# Patient Record
Sex: FEMALE | Race: WHITE | NOT HISPANIC OR LATINO | URBAN - METROPOLITAN AREA
[De-identification: names, ages, dates, MRNs, and addresses within clinical notes are randomized per-mention and may not be internally consistent; named-entity substitution may affect disease eponyms.]

---

## 2021-10-30 ENCOUNTER — EMERGENCY (EMERGENCY)
Facility: HOSPITAL | Age: 36
LOS: 1 days | Discharge: HOME | End: 2021-10-30
Attending: EMERGENCY MEDICINE | Admitting: EMERGENCY MEDICINE
Payer: SELF-PAY

## 2021-10-30 VITALS
TEMPERATURE: 99 F | SYSTOLIC BLOOD PRESSURE: 108 MMHG | OXYGEN SATURATION: 98 % | WEIGHT: 179.9 LBS | HEIGHT: 69 IN | DIASTOLIC BLOOD PRESSURE: 66 MMHG | RESPIRATION RATE: 16 BRPM | HEART RATE: 79 BPM

## 2021-10-30 DIAGNOSIS — H57.11 OCULAR PAIN, RIGHT EYE: ICD-10-CM

## 2021-10-30 DIAGNOSIS — H53.8 OTHER VISUAL DISTURBANCES: ICD-10-CM

## 2021-10-30 DIAGNOSIS — Z23 ENCOUNTER FOR IMMUNIZATION: ICD-10-CM

## 2021-10-30 DIAGNOSIS — S05.11XA CONTUSION OF EYEBALL AND ORBITAL TISSUES, RIGHT EYE, INITIAL ENCOUNTER: ICD-10-CM
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
## 2021-10-30 DIAGNOSIS — Z88.6 ALLERGY STATUS TO ANALGESIC AGENT: ICD-10-CM

## 2021-10-30 PROCEDURE — 99284 EMERGENCY DEPT VISIT MOD MDM: CPT

## 2021-10-30 PROCEDURE — 99053 MED SERV 10PM-8AM 24 HR FAC: CPT

## 2021-10-30 RX ORDER — TETANUS TOXOID, REDUCED DIPHTHERIA TOXOID AND ACELLULAR PERTUSSIS VACCINE, ADSORBED 5; 2.5; 8; 8; 2.5 [IU]/.5ML; [IU]/.5ML; UG/.5ML; UG/.5ML; UG/.5ML
0.5 SUSPENSION INTRAMUSCULAR ONCE
Refills: 0 | Status: COMPLETED | OUTPATIENT
Start: 2021-10-30 | End: 2021-10-30

## 2021-10-30 RX ORDER — ACETAMINOPHEN 500 MG
650 TABLET ORAL ONCE
Refills: 0 | Status: COMPLETED | OUTPATIENT
Start: 2021-10-30 | End: 2021-10-30

## 2021-10-30 NOTE — ED ADULT TRIAGE NOTE - CHIEF COMPLAINT QUOTE
opening bottle of wine and "wine top went into my eye" +blurred vision to right eye and redness. no glasses/contacts. unk tdap lmp 10/21/2021

## 2021-10-30 NOTE — ED PROVIDER NOTE - NOTES
Recommends atropine BID, predforte drops QID, eyeshield, head elevated, restricted activity avoiding anything that would cause rebleeding. Dr. Castaneda's office at [facility] will call her on Monday to arrange visit for Monday. If she has ophthalmologist she can see earlier, that is acceptable also.

## 2021-10-30 NOTE — ED PROVIDER NOTE - PHYSICAL EXAMINATION
Constitutional: awake and alert, holding hand over right eye  HEENT: head normocephalic and atraumatic. moist mucous membranes  Eyes: there is ~20% hyphema of the right eye and conjunctival injection. question cell and flare on slit lamp exam. no visible corneal defect on slit lamp exam. no abnormal uptake of fluorescein on woods lamp exam and negative roberto's sign. visual acuity 20/20 right eye and left eye uncorrected. IOP 10mmHg.  Neck: supple, normal ROM  Cardiovascular: regular rate   Pulmonary: no respiratory distress  Gastrointestinal: abdomen flat and nondistended  Skin: warm, dry, normal for ethnicity  Musculoskeletal: no edema, no deformity  Neurological: oriented x4, no focal neurologic deficit.   Psychiatric: calm and cooperative

## 2021-10-30 NOTE — ED PROVIDER NOTE - CARE PROVIDER_API CALL
Christine Castaneda)  [facility]  [address] [place], [place] [address]  Phone: [phone]  Fax: [phone]  Follow Up Time:

## 2021-10-30 NOTE — ED PROVIDER NOTE - OBJECTIVE STATEMENT
36-year-old woman with past medical history of migraines who presents to the ED complaining of right eye traumatic injury. She was opening a bottle of wine that is slightly fizzy and has a cap like a beer bottle and the cap was "shot" into her right eye. She had pain and blurred vision at the time, now much improved but she does see blood in the anterior chamber and complains of headache. Does not wear glasses or contacts. Unsure of date of last tetanus booster.

## 2021-10-30 NOTE — ED PROVIDER NOTE - PATIENT PORTAL LINK FT
You can access the FollowMyHealth Patient Portal offered by [facility] by registering at the following website: [email or web address]. By joining [employer]’s FollowMyHealth portal, you will also be able to view your health information using other applications (apps) compatible with our system.

## 2021-10-30 NOTE — ED PROVIDER NOTE - CLINICAL SUMMARY MEDICAL DECISION MAKING FREE TEXT BOX
Patient presenting with traumatic eye injury with hyphema and possible cell and flare in anterior chamber. no sign of globe rupture, corneal abrasion, or increased IOP. visual acuity intact. VS normal. tetanus booster given and tylenol for headache, will have patient keep head elevated and will consult ophthalmologist on call.

## 2021-10-30 NOTE — ED PROVIDER NOTE - NSFOLLOWUPINSTRUCTIONS_ED_ALL_ED_FT
Wear the eye shield at all times. Keep your head elevated above level or your heart at all times, including sleeping (sleep propped up or in a recliner). Avoid any strenuous activity (exercise, laughing, crying, coughing, vomiting, etc) because these can cause "rebleeding". Use the two eyedrops that were prescribed. The ophthalmology office at [facility] will call you on Monday morning to give you an appointment for sometime on Monday. If you have an ophthalmologist you can see earlier than that, you may do so. Come back to the ER if pain gets worse, you have vomiting, blurry vision, or increased size or blood over your iris.              Hyphema    WHAT YOU NEED TO KNOW:    Hyphema is the presence of blood in the space between the cornea and the iris of your eye. The cornea is the clear layer that covers the front of your eye. It protects the iris (colored part of the eye) and pupil.    Eye Anatomy         DISCHARGE INSTRUCTIONS:    Medicines:   •Cycloplegics: This medicine relaxes your eye muscles and decreases your pain so your eye can heal.      •Steroids: These eyedrops help decrease inflammation.      •Eye pressure medicines: These help decrease eye pressure.      •Acetaminophen: This medicine decreases pain. It is available without a doctor's order. Ask how much to take and how often to take it. Follow directions. Acetaminophen can cause liver damage if not taken correctly.      •Antinausea medicine: This medicine may be given to calm your stomach and to help prevent vomiting.       •Bowel movement softeners: This medicine makes it easier for you to have a bowel movement. It will help prevent straining, which can increase eye pressure.      •Take your medicine as directed. Contact your healthcare provider if you think your medicine is not helping or if you have side effects. Tell him of her if you are allergic to any medicine. Keep a list of the medicines, vitamins, and herbs you take. Include the amounts, and when and why you take them. Bring the list or the pill bottles to follow-up visits. Carry your medicine list with you in case of an emergency.      Follow up with your ophthalmologist in 1 day: Write down your questions so you remember to ask them during your visits.    Self-care:   •Rest: Rest when you feel it is needed. Raise the head of your bed, or rest in a recliner. This will help decrease the pressure in your eye.      •Limit activity: Do not lift, bend, or strain. This will help prevent more bleeding.       •Wear an eye shield: This will help protect your eye from further injury while it heals. You may need to wear it all the time, even while you sleep. Check under the shield often to make sure your eye is clean and dry.   Eye Shield           Contact your ophthalmologist if:   •You feel dizzy or lightheaded.      •Your eye is draining pus.      •You have questions or concerns about your condition or care.      Return to the emergency department if:   •You cannot stop vomiting.      •You have more blood in your eye after treatment.      •You have severe eye pain.      •Your vision gets worse.      •You suddenly have a loss of vision.

## 2021-10-31 RX ORDER — ATROPINE SULFATE 1 %
2 DROPS OPHTHALMIC (EYE)
Qty: 5 | Refills: 0
Start: 2021-10-31 | End: 2021-11-13

## 2021-10-31 RX ORDER — PREDNISOLONE SODIUM PHOSPHATE 1 %
1 DROPS OPHTHALMIC (EYE)
Qty: 5 | Refills: 0
Start: 2021-10-31 | End: 2021-11-04

## 2021-10-31 RX ORDER — PREDNISOLONE SODIUM PHOSPHATE 1 %
1 DROPS OPHTHALMIC (EYE) ONCE
Refills: 0 | Status: COMPLETED | OUTPATIENT
Start: 2021-10-31 | End: 2021-10-31

## 2021-10-31 RX ADMIN — Medication 650 MILLIGRAM(S): at 00:19

## 2021-10-31 RX ADMIN — Medication 1 DROP(S): at 00:45

## 2021-12-05 ENCOUNTER — EMERGENCY (EMERGENCY)
Facility: HOSPITAL | Age: 36
LOS: 1 days | Discharge: HOME | End: 2021-12-05
Attending: EMERGENCY MEDICINE | Admitting: EMERGENCY MEDICINE
Payer: COMMERCIAL

## 2021-12-05 VITALS
RESPIRATION RATE: 17 BRPM | DIASTOLIC BLOOD PRESSURE: 49 MMHG | HEART RATE: 73 BPM | OXYGEN SATURATION: 98 % | TEMPERATURE: 97 F | WEIGHT: 179.9 LBS | HEIGHT: 69 IN | SYSTOLIC BLOOD PRESSURE: 105 MMHG
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
## 2021-12-05 DIAGNOSIS — M25.572 PAIN IN LEFT ANKLE AND JOINTS OF LEFT FOOT: ICD-10-CM
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
## 2021-12-05 DIAGNOSIS — Z88.6 ALLERGY STATUS TO ANALGESIC AGENT: ICD-10-CM

## 2021-12-05 DIAGNOSIS — S82.832A OTHER FRACTURE OF UPPER AND LOWER END OF LEFT FIBULA, INITIAL ENCOUNTER FOR CLOSED FRACTURE: ICD-10-CM

## 2021-12-05 PROCEDURE — 73610 X-RAY EXAM OF ANKLE: CPT | Mod: 26,LT

## 2021-12-05 PROCEDURE — 73620 X-RAY EXAM OF FOOT: CPT | Mod: 26,LT

## 2021-12-05 PROCEDURE — 73630 X-RAY EXAM OF FOOT: CPT | Mod: 26,LT

## 2021-12-05 PROCEDURE — 73590 X-RAY EXAM OF LOWER LEG: CPT | Mod: 26,LT

## 2021-12-05 PROCEDURE — 99284 EMERGENCY DEPT VISIT MOD MDM: CPT | Mod: 25

## 2021-12-05 PROCEDURE — 99053 MED SERV 10PM-8AM 24 HR FAC: CPT

## 2021-12-05 NOTE — ED ADULT NURSE NOTE - OBJECTIVE STATEMENT
Pt presents to ed reporting left ankle injury x 1 week ago, sustained while hiking. initially thought it was sprain, has been elevating foot at home. no pain at rest, no pain with movement, only severe pain with weight bearing. has been using knee bike to move around. bruising present to top of foot, and shin area. able to wiggle toes, cap refill less than 2 seconds

## 2021-12-05 NOTE — ED ADULT TRIAGE NOTE - CHIEF COMPLAINT QUOTE
hiking 2days pta s/p trip and fall rolled left ankle medially c/o swelling, tenderness and bruising, nonweightbearing. no deformity noted. aox3 verbal, ambulatory with knee scooter. denies any pain medication pta lmp 11/15/21

## 2021-12-06 VITALS
DIASTOLIC BLOOD PRESSURE: 68 MMHG | TEMPERATURE: 98 F | HEART RATE: 67 BPM | OXYGEN SATURATION: 98 % | SYSTOLIC BLOOD PRESSURE: 105 MMHG | RESPIRATION RATE: 18 BRPM

## 2021-12-06 PROCEDURE — 73590 X-RAY EXAM OF LOWER LEG: CPT | Mod: 26,LT

## 2021-12-06 NOTE — ED PROVIDER NOTE - CARE PROVIDER_API CALL
Kody Cardoso)  Orthopaedic Surgery; Sports Medicine  [address] [place] [address]  Phone: [phone]  Fax: [phone]  Follow Up Time: 1-3 Days

## 2021-12-06 NOTE — ED PROVIDER NOTE - MUSCULOSKELETAL, MLM
+ mild tenderness and swelling distal lateral left leg / ankle nvi  -comparments of leg soft FROM of hip and knee

## 2021-12-06 NOTE — ED PROVIDER NOTE - NSFOLLOWUPINSTRUCTIONS_ED_ALL_ED_FT
follow up with your orthopedic doctor or Dr. Cardoso listed above     ibuprofen for pain     return to ER for worsening pain or any other concern

## 2021-12-06 NOTE — ED PROVIDER NOTE - PATIENT PORTAL LINK FT
You can access the FollowMyHealth Patient Portal offered by [facility] by registering at the following website: [email or web address]. By joining [employer]’s FollowMyHealth portal, you will also be able to view your health information using other applications (apps) compatible with our system.

## 2021-12-06 NOTE — ED PROVIDER NOTE - CLINICAL SUMMARY MEDICAL DECISION MAKING FREE TEXT BOX
ED evaluation and management discussed with the patient and family (if available) in detail.  Close PMD follow up encouraged.  Strict ED return instructions discussed in detail and patient given the opportunity to ask any questions about their discharge diagnosis and instructions. Patient verbalized understanding.    pt informed of nondisplaced fibula fx  cd of xrays handed to pt   pt friend present throughout the patient visit and at time of dc home  pt placed in cam boot prior to dc   she has her own leg walker / knee scooter ED evaluation and management discussed with the patient and family (if available) in detail.  Close PMD follow up encouraged.  Strict ED return instructions discussed in detail and patient given the opportunity to ask any questions about their discharge diagnosis and instructions. Patient verbalized understanding.    pt informed of nondisplaced fibula fx  cd of xrays handed to pt   pt sister present throughout the patient visit and at time of dc home  pt placed in cam boot prior to dc   she has her own leg walker / knee scooter

## 2021-12-06 NOTE — ED PROVIDER NOTE - OBJECTIVE STATEMENT
35 yo woman presents to the ER with her sister - complaint of left ankle pain x 1 week - s/p "rolling" her ankle while hiking in [place]  pt has been using a knee scooter since injury    The patient denies the following symptoms:  headache, dizziness/lightheadedness, neck pain/neck stiffness, difficulty swallowing, focal weakness, rash, fever, chills, chest/neck/jaw/arm or back pain, palpitations, shortness of breath, diaphoresis, N/V/D, abdominal pain, , back pain,